# Patient Record
Sex: MALE | Race: WHITE
[De-identification: names, ages, dates, MRNs, and addresses within clinical notes are randomized per-mention and may not be internally consistent; named-entity substitution may affect disease eponyms.]

---

## 2017-01-13 ENCOUNTER — HOSPITAL ENCOUNTER (OUTPATIENT)
Dept: HOSPITAL 19 - SDCO | Age: 62
Discharge: HOME | End: 2017-01-13
Payer: COMMERCIAL

## 2017-01-13 VITALS — HEART RATE: 58 BPM | DIASTOLIC BLOOD PRESSURE: 60 MMHG | SYSTOLIC BLOOD PRESSURE: 124 MMHG | TEMPERATURE: 98.1 F

## 2017-01-13 VITALS — HEART RATE: 64 BPM | TEMPERATURE: 98.9 F | SYSTOLIC BLOOD PRESSURE: 111 MMHG | DIASTOLIC BLOOD PRESSURE: 60 MMHG

## 2017-01-13 VITALS — HEART RATE: 62 BPM | DIASTOLIC BLOOD PRESSURE: 49 MMHG | SYSTOLIC BLOOD PRESSURE: 96 MMHG

## 2017-01-13 VITALS — DIASTOLIC BLOOD PRESSURE: 55 MMHG | HEART RATE: 63 BPM | SYSTOLIC BLOOD PRESSURE: 101 MMHG

## 2017-01-13 VITALS — HEART RATE: 59 BPM | SYSTOLIC BLOOD PRESSURE: 106 MMHG | DIASTOLIC BLOOD PRESSURE: 53 MMHG

## 2017-01-13 VITALS — WEIGHT: 207.9 LBS | HEIGHT: 63 IN | BODY MASS INDEX: 36.84 KG/M2

## 2017-01-13 VITALS — HEART RATE: 69 BPM | DIASTOLIC BLOOD PRESSURE: 59 MMHG | SYSTOLIC BLOOD PRESSURE: 104 MMHG

## 2017-01-13 DIAGNOSIS — I10: ICD-10-CM

## 2017-01-13 DIAGNOSIS — E11.9: ICD-10-CM

## 2017-01-13 DIAGNOSIS — N42.0: Primary | ICD-10-CM

## 2017-01-13 DIAGNOSIS — R35.1: ICD-10-CM

## 2017-01-13 DIAGNOSIS — N40.1: ICD-10-CM

## 2017-01-13 DIAGNOSIS — Z79.84: ICD-10-CM

## 2017-01-13 DIAGNOSIS — F17.210: ICD-10-CM

## 2017-01-13 DIAGNOSIS — R35.0: ICD-10-CM

## 2017-01-13 PROCEDURE — C1769 GUIDE WIRE: HCPCS

## 2020-01-08 ENCOUNTER — HOSPITAL ENCOUNTER (OUTPATIENT)
Dept: HOSPITAL 19 - SDCO | Age: 65
Discharge: HOME | End: 2020-01-08
Attending: INTERNAL MEDICINE
Payer: COMMERCIAL

## 2020-01-08 VITALS — TEMPERATURE: 97.6 F | DIASTOLIC BLOOD PRESSURE: 62 MMHG | HEART RATE: 64 BPM | SYSTOLIC BLOOD PRESSURE: 119 MMHG

## 2020-01-08 VITALS — WEIGHT: 209 LBS | BODY MASS INDEX: 37.03 KG/M2 | HEIGHT: 63 IN

## 2020-01-08 VITALS — HEART RATE: 65 BPM | SYSTOLIC BLOOD PRESSURE: 118 MMHG | DIASTOLIC BLOOD PRESSURE: 66 MMHG

## 2020-01-08 VITALS — DIASTOLIC BLOOD PRESSURE: 65 MMHG | SYSTOLIC BLOOD PRESSURE: 115 MMHG | HEART RATE: 66 BPM

## 2020-01-08 VITALS — SYSTOLIC BLOOD PRESSURE: 117 MMHG | HEART RATE: 63 BPM | DIASTOLIC BLOOD PRESSURE: 66 MMHG

## 2020-01-08 VITALS — TEMPERATURE: 98.3 F | DIASTOLIC BLOOD PRESSURE: 71 MMHG | HEART RATE: 71 BPM | SYSTOLIC BLOOD PRESSURE: 124 MMHG

## 2020-01-08 DIAGNOSIS — Z79.84: ICD-10-CM

## 2020-01-08 DIAGNOSIS — Z79.82: ICD-10-CM

## 2020-01-08 DIAGNOSIS — F17.210: ICD-10-CM

## 2020-01-08 DIAGNOSIS — Z12.11: Primary | ICD-10-CM

## 2020-01-08 DIAGNOSIS — I10: ICD-10-CM

## 2020-01-08 DIAGNOSIS — N40.0: ICD-10-CM

## 2020-01-08 DIAGNOSIS — K62.1: ICD-10-CM

## 2020-01-08 DIAGNOSIS — D12.3: ICD-10-CM

## 2020-01-08 DIAGNOSIS — K62.89: ICD-10-CM

## 2020-01-08 DIAGNOSIS — G47.33: ICD-10-CM

## 2020-01-08 DIAGNOSIS — I25.10: ICD-10-CM

## 2020-01-08 DIAGNOSIS — G89.29: ICD-10-CM

## 2020-01-08 DIAGNOSIS — K57.30: ICD-10-CM

## 2020-01-08 DIAGNOSIS — E11.9: ICD-10-CM

## 2020-01-08 DIAGNOSIS — D12.2: ICD-10-CM

## 2020-01-08 NOTE — NUR
Pts VS remain stable.  Denies nausea or pain.  Tolerated toast and coffee.  IV
removed and pressure dressing applied.  Discharge teaching completed, pt and
wife verbalized understanding.  Dr Diego visited with pt and wife post
procedure.  Pt dressed and taken via wheelchair to private vehicle for dc home
with wife driving.

## 2020-01-08 NOTE — NUR
Pt returned via cart to GI bay 2.  Wife present in room.  Pt ambulated with
SBA to recliner in bay with steady gait.  VSS-see flowsheet.  Denied pain or
nausea.  Requested toast and coffee.  Warm blanket given, call light in reach.